# Patient Record
Sex: FEMALE | Race: WHITE | Employment: OTHER | URBAN - METROPOLITAN AREA
[De-identification: names, ages, dates, MRNs, and addresses within clinical notes are randomized per-mention and may not be internally consistent; named-entity substitution may affect disease eponyms.]

---

## 2017-01-19 ENCOUNTER — OP HISTORICAL/CONVERTED ENCOUNTER (OUTPATIENT)
Dept: OTHER | Age: 82
End: 2017-01-19

## 2017-05-17 ENCOUNTER — ED HISTORICAL/CONVERTED ENCOUNTER (OUTPATIENT)
Dept: OTHER | Age: 82
End: 2017-05-17

## 2017-05-23 ENCOUNTER — IP HISTORICAL/CONVERTED ENCOUNTER (OUTPATIENT)
Dept: OTHER | Age: 82
End: 2017-05-23

## 2017-08-14 ENCOUNTER — OP HISTORICAL/CONVERTED ENCOUNTER (OUTPATIENT)
Dept: OTHER | Age: 82
End: 2017-08-14

## 2018-01-22 ENCOUNTER — ED HISTORICAL/CONVERTED ENCOUNTER (OUTPATIENT)
Dept: OTHER | Age: 83
End: 2018-01-22

## 2018-04-06 ENCOUNTER — OP HISTORICAL/CONVERTED ENCOUNTER (OUTPATIENT)
Dept: OTHER | Age: 83
End: 2018-04-06

## 2019-04-13 ENCOUNTER — ED HISTORICAL/CONVERTED ENCOUNTER (OUTPATIENT)
Dept: OTHER | Age: 84
End: 2019-04-13

## 2019-05-06 ENCOUNTER — OP HISTORICAL/CONVERTED ENCOUNTER (OUTPATIENT)
Dept: OTHER | Age: 84
End: 2019-05-06

## 2020-07-27 ENCOUNTER — OP HISTORICAL/CONVERTED ENCOUNTER (OUTPATIENT)
Dept: OTHER | Age: 85
End: 2020-07-27

## 2020-07-29 ENCOUNTER — OP HISTORICAL/CONVERTED ENCOUNTER (OUTPATIENT)
Dept: OTHER | Age: 85
End: 2020-07-29

## 2021-03-29 ENCOUNTER — TELEPHONE (OUTPATIENT)
Dept: ENDOCRINOLOGY | Age: 86
End: 2021-03-29

## 2021-03-29 ENCOUNTER — OFFICE VISIT (OUTPATIENT)
Dept: ENDOCRINOLOGY | Age: 86
End: 2021-03-29
Payer: MEDICARE

## 2021-03-29 VITALS
OXYGEN SATURATION: 94 % | SYSTOLIC BLOOD PRESSURE: 158 MMHG | WEIGHT: 143.6 LBS | HEART RATE: 72 BPM | DIASTOLIC BLOOD PRESSURE: 79 MMHG | BODY MASS INDEX: 23.08 KG/M2 | HEIGHT: 66 IN | TEMPERATURE: 98.2 F

## 2021-03-29 DIAGNOSIS — E05.90 SUBCLINICAL HYPERTHYROIDISM: Primary | ICD-10-CM

## 2021-03-29 PROBLEM — J44.9 COPD (CHRONIC OBSTRUCTIVE PULMONARY DISEASE) (HCC): Status: ACTIVE | Noted: 2021-03-29

## 2021-03-29 PROBLEM — I10 BENIGN ESSENTIAL HTN: Status: ACTIVE | Noted: 2021-03-29

## 2021-03-29 PROBLEM — E04.1 THYROID NODULE: Status: ACTIVE | Noted: 2021-03-29

## 2021-03-29 PROBLEM — M81.0 AGE-RELATED OSTEOPOROSIS WITHOUT CURRENT PATHOLOGICAL FRACTURE: Status: ACTIVE | Noted: 2021-03-29

## 2021-03-29 PROCEDURE — 1090F PRES/ABSN URINE INCON ASSESS: CPT | Performed by: INTERNAL MEDICINE

## 2021-03-29 PROCEDURE — G8420 CALC BMI NORM PARAMETERS: HCPCS | Performed by: INTERNAL MEDICINE

## 2021-03-29 PROCEDURE — 1101F PT FALLS ASSESS-DOCD LE1/YR: CPT | Performed by: INTERNAL MEDICINE

## 2021-03-29 PROCEDURE — 99204 OFFICE O/P NEW MOD 45 MIN: CPT | Performed by: INTERNAL MEDICINE

## 2021-03-29 PROCEDURE — G8536 NO DOC ELDER MAL SCRN: HCPCS | Performed by: INTERNAL MEDICINE

## 2021-03-29 PROCEDURE — G8427 DOCREV CUR MEDS BY ELIG CLIN: HCPCS | Performed by: INTERNAL MEDICINE

## 2021-03-29 PROCEDURE — G8510 SCR DEP NEG, NO PLAN REQD: HCPCS | Performed by: INTERNAL MEDICINE

## 2021-03-29 RX ORDER — ALBUTEROL SULFATE 90 UG/1
AEROSOL, METERED RESPIRATORY (INHALATION)
COMMUNITY
Start: 2021-01-11

## 2021-03-29 RX ORDER — FLUTICASONE PROPIONATE AND SALMETEROL 50; 250 UG/1; UG/1
POWDER RESPIRATORY (INHALATION)
COMMUNITY
Start: 2021-03-12

## 2021-03-29 RX ORDER — ALPRAZOLAM 0.25 MG/1
TABLET ORAL
COMMUNITY
Start: 2021-02-10

## 2021-03-29 RX ORDER — IPRATROPIUM BROMIDE AND ALBUTEROL SULFATE 2.5; .5 MG/3ML; MG/3ML
SOLUTION RESPIRATORY (INHALATION)
COMMUNITY
Start: 2021-02-10

## 2021-03-29 RX ORDER — MONTELUKAST SODIUM 10 MG/1
TABLET ORAL
COMMUNITY
Start: 2021-01-12 | End: 2021-06-29 | Stop reason: ALTCHOICE

## 2021-03-29 RX ORDER — ATENOLOL 50 MG/1
TABLET ORAL
COMMUNITY
Start: 2021-03-12

## 2021-03-29 RX ORDER — ASPIRIN 81 MG/1
1 TABLET ORAL DAILY
COMMUNITY
End: 2021-06-29 | Stop reason: ALTCHOICE

## 2021-03-29 RX ORDER — AZELASTINE 1 MG/ML
SPRAY, METERED NASAL
COMMUNITY
Start: 2021-03-02

## 2021-03-29 RX ORDER — LIDOCAINE 50 MG/G
1 PATCH TOPICAL DAILY
COMMUNITY
Start: 2020-10-09 | End: 2021-06-29 | Stop reason: ALTCHOICE

## 2021-03-29 RX ORDER — AMLODIPINE BESYLATE 5 MG/1
TABLET ORAL
COMMUNITY
Start: 2021-01-12

## 2021-03-29 RX ORDER — IBANDRONATE SODIUM 150 MG/1
TABLET, FILM COATED ORAL
COMMUNITY
Start: 2021-01-12

## 2021-03-29 RX ORDER — ATORVASTATIN CALCIUM 10 MG/1
TABLET, FILM COATED ORAL
COMMUNITY
Start: 2021-01-12

## 2021-03-29 RX ORDER — ROFLUMILAST 500 UG/1
TABLET ORAL
COMMUNITY
Start: 2021-03-03

## 2021-03-29 NOTE — LETTER
3/29/2021 Patient: Ila Hung YOB: 1933 Date of Visit: 3/29/2021 Malick Montaño MD 
[facility] [address] Via Fax: [phone] Tarun Gutiérrez MD 
[facility] [address] Via Fax: [phone] Dear MD Tarun Kim MD, Thank you for referring Ms. Ila Hung to [address] [place] [address] for evaluation. My notes for this consultation are attached. If you have questions, please do not hesitate to call me. I look forward to following your patient along with you. Sincerely, Devaughn Skinner MD

## 2021-03-29 NOTE — TELEPHONE ENCOUNTER
Per PCP note patient had thyroid ultrasound done in 2018, 2019 and 2020. Please call PCP office and ask them to fax all these 3 reports.

## 2021-03-29 NOTE — PROGRESS NOTES
History and Physical    Patient: Jovita King MRN: [id number]  SSN: [id number]    YOB: 1933  Age: 80 y.o. Sex: female      Subjective:      Jovita King is a 80 y.o. female with past medical history of hypertension, hyperlipidemia, COPD, osteoporosis, DVT and PE, pneumothorax is sent to me by primary care physician Dr. Savage Ram for thyroid nodule and abnormal thyroid function tests. Thyroid nodules:  Incidentally found when she had CT chest done for pneumothorax. Since then the pulmonologist who follows her for pneumothorax has been tracking it when she has her annual CT chest.  Also, patient has had ultrasound thyroid done in 2018, 2019 and 2020. It appears that the nodules appeared stable and no further follow-up was required. Denies any difficulty in swallowing, difficulty in breathing or hoarseness of voice. No family history of thyroid cancer. No personal history of radiation exposure to head or neck region. Abnormal thyroid function tests:  No family history of thyroid problems. No prior history of thyroid issues except the above. Patient thinks her weight has been stable, sleep is poor but she thinks she is stressed because of some life events, she has some tremors of her hands, not sure since how long, has 1-3 bowel movements per day, denies any heat or cold intolerance, excessive sweating, hair or skin changes. Does not take biotin or hair skin nail vitamins. Last prednisone treatment was in July 2020. No steroid injections in her joints. She has been on atenolol since 2016 for hypertension. She has osteoporosis for which she takes Boniva, prescribed by PCP. No cardiac issues. Past Medical History:   Diagnosis Date    Hyperlipidemia     Hypertension     Thyroid disease      History reviewed. No pertinent surgical history.    Family History   Problem Relation Age of Onset    Cancer Mother      Social History     Tobacco Use    Smoking status: Former Smoker    Smokeless tobacco: Never Used   Substance Use Topics    Alcohol use: Not Currently      Prior to Admission medications    Medication Sig Start Date End Date Taking? Authorizing Provider   albuterol (PROVENTIL HFA, VENTOLIN HFA, PROAIR HFA) 90 mcg/actuation inhaler inhale 2 puffs by mouth every 4 hours if needed 1/11/21  Yes Provider, Historical   amLODIPine (NORVASC) 5 mg tablet  1/12/21  Yes Provider, Historical   atenoloL (TENORMIN) 50 mg tablet  3/12/21  Yes Provider, Historical   ALPRAZolam Verlene Jackie) 0.25 mg tablet  2/10/21  Yes Provider, Historical   aspirin delayed-release 81 mg tablet Take 1 Tab by mouth daily. Yes Provider, Historical   Advair Diskus 250-50 mcg/dose diskus inhaler  3/12/21  Yes Provider, Historical   azelastine (ASTELIN) 137 mcg (0.1 %) nasal spray instill 2 sprays into each nostril twice a day 3/2/21  Yes Provider, Historical   ibandronate (BONIVA) 150 mg tablet  1/12/21  Yes Provider, Historical   albuterol-ipratropium (DUO-NEB) 2.5 mg-0.5 mg/3 ml nebu  2/10/21  Yes Provider, Historical   atorvastatin (LIPITOR) 10 mg tablet  1/12/21  Yes Provider, Historical   lidocaine (LIDODERM) 5 % 1 Patch daily. 10/9/20  Yes Provider, Historical   montelukast (SINGULAIR) 10 mg tablet  1/12/21  Yes Provider, Historical   Daliresp 500 mcg tab tablet  3/3/21  Yes Provider, Historical   tiotropium (Spiriva with HandiHaler) 18 mcg inhalation capsule Spiriva with HandiHaler 18 mcg and inhalation capsules   Inhale by inhalation route.    Yes Provider, Historical        Allergies   Allergen Reactions    Levofloxacin Unknown (comments)    Tetracycline Unknown (comments)       Review of Systems:  ROS    A comprehensive review of systems was preformed and it is negative except mentioned in HPI    Objective:     Vitals:    03/29/21 1547 03/29/21 1559   BP: (!) 142/65 (!) 158/79   Pulse: 74 72   Temp: 98.2 °F (36.8 °C)    TempSrc: Temporal    SpO2: 94%    Weight: 143 lb 9.6 oz (65.1 kg)    Height: 5' 6\" (1.676 m)         Physical Exam:    Physical Exam  Vitals signs and nursing note reviewed. Constitutional:       Appearance: Normal appearance. HENT:      Head: Normocephalic and atraumatic. Eyes:      Extraocular Movements: Extraocular movements intact. Pupils: Pupils are equal, round, and reactive to light. Neck:      Musculoskeletal: Neck supple. Cardiovascular:      Rate and Rhythm: Normal rate and regular rhythm. Pulmonary:      Effort: Pulmonary effort is normal.      Breath sounds: Normal breath sounds. Abdominal:      General: Bowel sounds are normal.      Palpations: Abdomen is soft. Musculoskeletal: Normal range of motion. General: No swelling. Skin:     General: Skin is warm and dry. Neurological:      General: No focal deficit present. Mental Status: She is alert and oriented to person, place, and time. Comments: Fine tremors of outstretched hands   Psychiatric:         Mood and Affect: Mood normal.         Behavior: Behavior normal.          Labs and Imaging:    Last 3 Recorded Weights in this Encounter    03/29/21 1547   Weight: 143 lb 9.6 oz (65.1 kg)        No results found for: HBA1C, HGBE8, BCR3YZVK, WOA4NNXR, MQR1EIRY     Assessment:     Patient Active Problem List   Diagnosis Code    Subclinical hyperthyroidism E05.90    Benign essential HTN I10    Thyroid nodule E04.1    Age-related osteoporosis without current pathological fracture M81.0    COPD (chronic obstructive pulmonary disease) ([facility]) J44.9           Plan:     Abnormal thyroid function tests:  I reviewed labs and notes from the referring provider's office. 2-4-2021:  TSH suppressed at 0.393 (0.452. 5)  Free T4 normal at 1.46 (0.821.77)    Per PCP note, TSH has varied from 0.53 to 0.435 and now to 0.39    Clinically she has some symptoms of hyperthyroidism including tremors, frequent bowel movements, insomnia.   Plan:  Given her age as well as diagnosis of osteoporosis, treatment will be beneficial in this patient to protect her heart and bones. However, patient is not convinced at this time that she needs treatment. We will repeat complete thyroid labs along with thyroid antibodies in 3 months, prior to next visit. After reviewing these results, I will discuss about treatment again with patient. Left thyroid nodule:  Per PCP note this has been stable from 2018, 2019, 2020. I will obtain ultrasound reports from PCP. Osteoporosis:  On Boniva per primary care physician. Essential hypertension:  Blood pressure slightly elevated today, which may be acceptable given patient's age.     Orders Placed This Encounter    TSH AND FREE T4     Standing Status:   Future     Standing Expiration Date:   9/29/2021    T3 TOTAL     Standing Status:   Future     Standing Expiration Date:   9/29/2021    THYROID STIMULATING IMMUNOGLOBULIN     Standing Status:   Future     Standing Expiration Date:   9/29/2021    THYROID PEROXIDASE (TPO) AB     Standing Status:   Future     Standing Expiration Date:   9/29/2021    TSH RECEPTOR AB     Standing Status:   Future     Standing Expiration Date:   9/29/2021        Signed By: Tesfaye Guevara MD     March 29, 2021      Return to clinic 3 months

## 2021-04-12 NOTE — TELEPHONE ENCOUNTER
Did I ever give you labs for this patient? Trying to figure out if I need to call the office back for results.

## 2021-06-22 LAB
T3 SERPL-MCNC: 134 NG/DL (ref 71–180)
T4 FREE SERPL-MCNC: 1.5 NG/DL (ref 0.82–1.77)
THYROPEROXIDASE AB SERPL-ACNC: 11 IU/ML (ref 0–34)
TSH RECEP AB SER-ACNC: <1.1 IU/L (ref 0–1.75)
TSH SERPL DL<=0.005 MIU/L-ACNC: 0.53 UIU/ML (ref 0.45–4.5)
TSI SER-ACNC: <0.1 IU/L (ref 0–0.55)

## 2021-06-29 ENCOUNTER — OFFICE VISIT (OUTPATIENT)
Dept: ENDOCRINOLOGY | Age: 86
End: 2021-06-29
Payer: MEDICARE

## 2021-06-29 VITALS
SYSTOLIC BLOOD PRESSURE: 132 MMHG | TEMPERATURE: 97.7 F | BODY MASS INDEX: 23.14 KG/M2 | HEART RATE: 70 BPM | OXYGEN SATURATION: 95 % | WEIGHT: 144 LBS | DIASTOLIC BLOOD PRESSURE: 76 MMHG | HEIGHT: 66 IN

## 2021-06-29 DIAGNOSIS — E05.90 SUBCLINICAL HYPERTHYROIDISM: Primary | ICD-10-CM

## 2021-06-29 DIAGNOSIS — E05.90 SUBCLINICAL HYPERTHYROIDISM: ICD-10-CM

## 2021-06-29 PROCEDURE — 1101F PT FALLS ASSESS-DOCD LE1/YR: CPT | Performed by: INTERNAL MEDICINE

## 2021-06-29 PROCEDURE — 1090F PRES/ABSN URINE INCON ASSESS: CPT | Performed by: INTERNAL MEDICINE

## 2021-06-29 PROCEDURE — 99214 OFFICE O/P EST MOD 30 MIN: CPT | Performed by: INTERNAL MEDICINE

## 2021-06-29 PROCEDURE — G8420 CALC BMI NORM PARAMETERS: HCPCS | Performed by: INTERNAL MEDICINE

## 2021-06-29 PROCEDURE — G8510 SCR DEP NEG, NO PLAN REQD: HCPCS | Performed by: INTERNAL MEDICINE

## 2021-06-29 PROCEDURE — G8427 DOCREV CUR MEDS BY ELIG CLIN: HCPCS | Performed by: INTERNAL MEDICINE

## 2021-06-29 PROCEDURE — G8536 NO DOC ELDER MAL SCRN: HCPCS | Performed by: INTERNAL MEDICINE

## 2021-06-29 RX ORDER — MINERAL OIL
ENEMA (ML) RECTAL
COMMUNITY

## 2021-06-29 NOTE — PROGRESS NOTES
History and Physical    Patient: Rupinder Jacobo MRN: [id number]  SSN: [id number]    YOB: 1933  Age: 80 y.o. Sex: female      Subjective:      Rupinder Jacobo is a 80 y.o. female with past medical history of hypertension, hyperlipidemia, COPD, osteoporosis, DVT and PE, pneumothorax is for follow-up of abnormal thyroid function tests. She was sent to me by primary care physician Dr. Hillary France. Thyroid nodules:  After the last visit I obtained and reviewed ultrasound reports from 2018 in 2020. Patient denies any change in her neck symptoms since the last visit. Initial history:  Incidentally found when she had CT chest done for pneumothorax. Since then the pulmonologist who follows her for pneumothorax has been tracking it when she has her annual CT chest.  Also, patient has had ultrasound thyroid done in 2018, 2019 and 2020. It appears that the nodules appeared stable and no further follow-up was required. Denies any difficulty in swallowing, difficulty in breathing or hoarseness of voice. No family history of thyroid cancer. No personal history of radiation exposure to head or neck region. Abnormal thyroid function tests: At the last visit I discussed with patient that her TSH is fluctuating. Some of her symptoms could be related to thyroid, especially anxiety, palpitations. We discussed observation versus medical management. Patient decided observation because she was not convinced she needs treatment. She had labs repeated prior to this visit. She tells me that since the last visit she has not had any more palpitations or anxiety. Initial history:  No family history of thyroid problems. No prior history of thyroid issues except the above.   Patient thinks her weight has been stable, sleep is poor but she thinks she is stressed because of some life events, she has some tremors of her hands, not sure since how long, has 1-3 bowel movements per day, denies any heat or cold intolerance, excessive sweating, hair or skin changes. Does not take biotin or hair skin nail vitamins. Last prednisone treatment was in July 2020. No steroid injections in her joints. She has been on atenolol since 2016 for hypertension. She has osteoporosis for which she takes Boniva, prescribed by PCP. No cardiac issues. Past Medical History:   Diagnosis Date    Hyperlipidemia     Hypertension     Thyroid disease      History reviewed. No pertinent surgical history. Family History   Problem Relation Age of Onset    Cancer Mother      Social History     Tobacco Use    Smoking status: Former Smoker    Smokeless tobacco: Never Used   Substance Use Topics    Alcohol use: Not Currently      Prior to Admission medications    Medication Sig Start Date End Date Taking? Authorizing Provider   fexofenadine (ALLEGRA) 180 mg tablet Take  by mouth. Yes Provider, Historical   albuterol (PROVENTIL HFA, VENTOLIN HFA, PROAIR HFA) 90 mcg/actuation inhaler inhale 2 puffs by mouth every 4 hours if needed 1/11/21  Yes Provider, Historical   amLODIPine (NORVASC) 5 mg tablet  1/12/21  Yes Provider, Historical   atenoloL (TENORMIN) 50 mg tablet  3/12/21  Yes Provider, Historical   ALPRAZolam (XANAX) 0.25 mg tablet  2/10/21  Yes Provider, Historical   Advair Diskus 250-50 mcg/dose diskus inhaler  3/12/21  Yes Provider, Historical   azelastine (ASTELIN) 137 mcg (0.1 %) nasal spray instill 2 sprays into each nostril twice a day 3/2/21  Yes Provider, Historical   ibandronate (BONIVA) 150 mg tablet  1/12/21  Yes Provider, Historical   albuterol-ipratropium (DUO-NEB) 2.5 mg-0.5 mg/3 ml nebu  2/10/21  Yes Provider, Historical   atorvastatin (LIPITOR) 10 mg tablet  1/12/21  Yes Provider, Historical   Daliresp 500 mcg tab tablet  3/3/21  Yes Provider, Historical   tiotropium (Spiriva with HandiHaler) 18 mcg inhalation capsule Spiriva with HandiHaler 18 mcg and inhalation capsules   Inhale by inhalation route.    Yes Provider, Historical        Allergies   Allergen Reactions    Levofloxacin Unknown (comments)    Tetracycline Unknown (comments)       Review of Systems:  ROS    A comprehensive review of systems was preformed and it is negative except mentioned in HPI    Objective:     Vitals:    06/29/21 1416   BP: 132/76   Pulse: 70   Temp: 97.7 °F (36.5 °C)   TempSrc: Temporal   SpO2: 95%   Weight: 144 lb (65.3 kg)   Height: 5' 6\" (1.676 m)        Physical Exam:    Physical Exam  Vitals and nursing note reviewed. Constitutional:       Appearance: Normal appearance. HENT:      Head: Normocephalic and atraumatic. Neurological:      Mental Status: She is alert. Labs and Imaging:    Last 3 Recorded Weights in this Encounter    06/29/21 1416   Weight: 144 lb (65.3 kg)        No results found for: HBA1C, ZTO3HIWW, TYB1MRAR, WOW6TLTR     Assessment:     Patient Active Problem List   Diagnosis Code    Subclinical hyperthyroidism E05.90    Benign essential HTN I10    Thyroid nodule E04.1    Age-related osteoporosis without current pathological fracture M81.0    COPD (chronic obstructive pulmonary disease) ([address] J44.9           Plan:     Abnormal thyroid function tests:  2-4-2021:  TSH suppressed at 0.393 (0.452. 5)  Free T4 normal at 1.46 (0.821.77)    Per PCP note, TSH has varied from 0.53 to 0.435 and now to 0.39    Clinically she has some symptoms of hyperthyroidism including tremors, frequent bowel movements, insomnia. Given her age as well as diagnosis of osteoporosis, treatment will be beneficial in this patient to protect her heart and bones. However, patient is not convinced at this time that she needs treatment. 1958629:  TSH normal at 0.529  Free T4 normal at 1.5  Total T3 normal at 134  TPO normal at 11 (034)  TSI and TSH receptor antibody undetectable  Plan:  Not sure why TSH has been suppressed intermittently, ?   Subacute thyroiditis which appears to have resolved or intermittently functioning thyroid nodule  No indication for treatment at this time. Follow-up with TSH every 6 months with PCP. I will see her back as needed. Left thyroid nodule: Thyroid ultrasound report from 4-6-2018:  Left lobe spongiform nodule 0.8 x 0.9 x 1 cm    7-:  Left lobe spongiform nodule 9.9 x 8.2 x 10.3 mm     Given stability in the size of this nodule as well as no concerning features, no further follow-up required. Osteoporosis:  On Boniva per primary care physician. Essential hypertension:  Blood pressure well controlled on current medications    No orders of the defined types were placed in this encounter.        Signed By: Opal Phillips MD     June 29, 2021      Return to clinic as needed

## 2021-06-29 NOTE — LETTER
6/29/2021    Patient: Piotr Thorpe   YOB: 1933   Date of Visit: 6/29/2021     Faisal Downing MD  [address] [address]  Via Fax: [phone]    Dear Faisal Downing MD,      Thank you for referring Ms. Piotr Thorpe to [address] [place] [address] for evaluation. My notes for this consultation are attached. If you have questions, please do not hesitate to call me. I look forward to following your patient along with you.       Sincerely,    Aldair Velázquez MD

## 2021-07-12 ENCOUNTER — TRANSCRIBE ORDER (OUTPATIENT)
Dept: SCHEDULING | Age: 86
End: 2021-07-12

## 2021-07-12 DIAGNOSIS — Z12.31 SCREENING MAMMOGRAM FOR HIGH-RISK PATIENT: Primary | ICD-10-CM

## 2021-07-14 ENCOUNTER — HOSPITAL ENCOUNTER (OUTPATIENT)
Dept: MAMMOGRAPHY | Age: 86
Discharge: HOME | End: 2021-07-14
Attending: FAMILY MEDICINE
Payer: MEDICARE

## 2021-07-14 DIAGNOSIS — Z12.31 SCREENING MAMMOGRAM FOR HIGH-RISK PATIENT: ICD-10-CM

## 2021-07-14 PROCEDURE — 77063 BREAST TOMOSYNTHESIS BI: CPT

## 2022-03-19 PROBLEM — E05.90 SUBCLINICAL HYPERTHYROIDISM: Status: ACTIVE | Noted: 2021-03-29

## 2022-03-19 PROBLEM — I10 BENIGN ESSENTIAL HTN: Status: ACTIVE | Noted: 2021-03-29

## 2022-03-19 PROBLEM — M81.0 AGE-RELATED OSTEOPOROSIS WITHOUT CURRENT PATHOLOGICAL FRACTURE: Status: ACTIVE | Noted: 2021-03-29

## 2022-03-20 PROBLEM — E04.1 THYROID NODULE: Status: ACTIVE | Noted: 2021-03-29

## 2022-03-20 PROBLEM — J44.9 COPD (CHRONIC OBSTRUCTIVE PULMONARY DISEASE) (HCC): Status: ACTIVE | Noted: 2021-03-29

## 2022-09-06 ENCOUNTER — TRANSCRIBE ORDER (OUTPATIENT)
Dept: SCHEDULING | Age: 87
End: 2022-09-06

## 2022-09-06 DIAGNOSIS — Z12.31 VISIT FOR SCREENING MAMMOGRAM: Primary | ICD-10-CM

## 2022-11-15 ENCOUNTER — HOSPITAL ENCOUNTER (OUTPATIENT)
Dept: MAMMOGRAPHY | Age: 87
Discharge: HOME | End: 2022-11-15
Attending: FAMILY MEDICINE
Payer: MEDICARE

## 2022-11-15 DIAGNOSIS — Z12.31 VISIT FOR SCREENING MAMMOGRAM: ICD-10-CM

## 2022-11-15 PROCEDURE — 77063 BREAST TOMOSYNTHESIS BI: CPT

## 2023-05-08 ENCOUNTER — HOSPITAL ENCOUNTER (OUTPATIENT)
Facility: HOSPITAL | Age: 88
Discharge: HOME | End: 2023-05-11
Payer: MEDICARE

## 2023-05-08 ENCOUNTER — TRANSCRIBE ORDERS (OUTPATIENT)
Facility: HOSPITAL | Age: 88
End: 2023-05-08

## 2023-05-08 DIAGNOSIS — R93.5 ABNORMAL CT OF THE ABDOMEN: Primary | ICD-10-CM

## 2023-05-08 DIAGNOSIS — R93.5 ABNORMAL CT OF THE ABDOMEN: ICD-10-CM

## 2023-05-08 PROCEDURE — 76856 US EXAM PELVIC COMPLETE: CPT

## 2023-05-24 ENCOUNTER — ANESTHESIA (OUTPATIENT)
Facility: HOSPITAL | Age: 88
End: 2023-05-24
Payer: MEDICARE

## 2023-05-24 ENCOUNTER — HOSPITAL ENCOUNTER (OUTPATIENT)
Facility: HOSPITAL | Age: 88
Setting detail: OUTPATIENT SURGERY
Discharge: HOME | End: 2023-05-24
Attending: INTERNAL MEDICINE | Admitting: INTERNAL MEDICINE
Payer: MEDICARE

## 2023-05-24 ENCOUNTER — ANESTHESIA EVENT (OUTPATIENT)
Facility: HOSPITAL | Age: 88
End: 2023-05-24
Payer: MEDICARE

## 2023-05-24 VITALS
BODY MASS INDEX: 19.78 KG/M2 | RESPIRATION RATE: 14 BRPM | DIASTOLIC BLOOD PRESSURE: 74 MMHG | HEIGHT: 67 IN | OXYGEN SATURATION: 94 % | TEMPERATURE: 98.4 F | HEART RATE: 90 BPM | WEIGHT: 126 LBS | SYSTOLIC BLOOD PRESSURE: 125 MMHG

## 2023-05-24 PROCEDURE — 3600007512: Performed by: INTERNAL MEDICINE

## 2023-05-24 PROCEDURE — 2580000003 HC RX 258: Performed by: INTERNAL MEDICINE

## 2023-05-24 PROCEDURE — 3600007502: Performed by: INTERNAL MEDICINE

## 2023-05-24 PROCEDURE — 88305 TISSUE EXAM BY PATHOLOGIST: CPT

## 2023-05-24 PROCEDURE — 6360000002 HC RX W HCPCS: Performed by: NURSE ANESTHETIST, CERTIFIED REGISTERED

## 2023-05-24 PROCEDURE — 3700000000 HC ANESTHESIA ATTENDED CARE: Performed by: INTERNAL MEDICINE

## 2023-05-24 PROCEDURE — 2709999900 HC NON-CHARGEABLE SUPPLY: Performed by: INTERNAL MEDICINE

## 2023-05-24 PROCEDURE — 2580000003 HC RX 258: Performed by: NURSE ANESTHETIST, CERTIFIED REGISTERED

## 2023-05-24 PROCEDURE — 7100000011 HC PHASE II RECOVERY - ADDTL 15 MIN: Performed by: INTERNAL MEDICINE

## 2023-05-24 PROCEDURE — 3700000001 HC ADD 15 MINUTES (ANESTHESIA): Performed by: INTERNAL MEDICINE

## 2023-05-24 PROCEDURE — 2500000003 HC RX 250 WO HCPCS: Performed by: NURSE ANESTHETIST, CERTIFIED REGISTERED

## 2023-05-24 PROCEDURE — 7100000010 HC PHASE II RECOVERY - FIRST 15 MIN: Performed by: INTERNAL MEDICINE

## 2023-05-24 RX ORDER — SODIUM CHLORIDE 9 MG/ML
INJECTION, SOLUTION INTRAVENOUS CONTINUOUS PRN
Status: COMPLETED | OUTPATIENT
Start: 2023-05-24 | End: 2023-05-24

## 2023-05-24 RX ORDER — ALPRAZOLAM 0.25 MG/1
0.25 TABLET ORAL NIGHTLY PRN
COMMUNITY

## 2023-05-24 RX ORDER — SODIUM CHLORIDE 9 MG/ML
25 INJECTION, SOLUTION INTRAVENOUS PRN
Status: DISCONTINUED | OUTPATIENT
Start: 2023-05-24 | End: 2023-05-24 | Stop reason: HOSPADM

## 2023-05-24 RX ORDER — SODIUM CHLORIDE 9 MG/ML
INJECTION, SOLUTION INTRAVENOUS CONTINUOUS
Status: DISCONTINUED | OUTPATIENT
Start: 2023-05-24 | End: 2023-05-24 | Stop reason: HOSPADM

## 2023-05-24 RX ORDER — METHIMAZOLE 5 MG/1
5 TABLET ORAL 3 TIMES DAILY
COMMUNITY

## 2023-05-24 RX ORDER — ATENOLOL 100 MG/1
100 TABLET ORAL DAILY
COMMUNITY

## 2023-05-24 RX ORDER — FLUTICASONE PROPIONATE AND SALMETEROL 100; 50 UG/1; UG/1
1 POWDER RESPIRATORY (INHALATION) EVERY 12 HOURS
COMMUNITY

## 2023-05-24 RX ORDER — FOLIC ACID 1 MG/1
1 TABLET ORAL DAILY
COMMUNITY

## 2023-05-24 RX ORDER — 0.9 % SODIUM CHLORIDE 0.9 %
INTRAVENOUS SOLUTION INTRAVENOUS PRN
Status: DISCONTINUED | OUTPATIENT
Start: 2023-05-24 | End: 2023-05-24 | Stop reason: SDUPTHER

## 2023-05-24 RX ORDER — ROFLUMILAST 500 UG/1
500 TABLET ORAL DAILY
COMMUNITY

## 2023-05-24 RX ORDER — IBANDRONATE SODIUM 150 MG/1
150 TABLET, FILM COATED ORAL
COMMUNITY

## 2023-05-24 RX ORDER — CHOLECALCIFEROL (VITAMIN D3) 1250 MCG
CAPSULE ORAL
COMMUNITY

## 2023-05-24 RX ORDER — SODIUM CHLORIDE 0.9 % (FLUSH) 0.9 %
5-40 SYRINGE (ML) INJECTION EVERY 12 HOURS SCHEDULED
Status: DISCONTINUED | OUTPATIENT
Start: 2023-05-24 | End: 2023-05-24 | Stop reason: HOSPADM

## 2023-05-24 RX ORDER — AMLODIPINE BESYLATE 5 MG/1
5 TABLET ORAL DAILY
COMMUNITY

## 2023-05-24 RX ORDER — SODIUM CHLORIDE 0.9 % (FLUSH) 0.9 %
5-40 SYRINGE (ML) INJECTION PRN
Status: DISCONTINUED | OUTPATIENT
Start: 2023-05-24 | End: 2023-05-24 | Stop reason: HOSPADM

## 2023-05-24 RX ORDER — LIDOCAINE HYDROCHLORIDE 20 MG/ML
INJECTION, SOLUTION EPIDURAL; INFILTRATION; INTRACAUDAL; PERINEURAL PRN
Status: DISCONTINUED | OUTPATIENT
Start: 2023-05-24 | End: 2023-05-24 | Stop reason: SDUPTHER

## 2023-05-24 RX ORDER — ASPIRIN 81 MG/1
81 TABLET ORAL DAILY
COMMUNITY

## 2023-05-24 RX ORDER — BACILLUS COAGULANS/INULIN 1B-250 MG
CAPSULE ORAL
COMMUNITY

## 2023-05-24 RX ORDER — PHENYLEPHRINE HCL IN 0.9% NACL 0.4MG/10ML
SYRINGE (ML) INTRAVENOUS PRN
Status: DISCONTINUED | OUTPATIENT
Start: 2023-05-24 | End: 2023-05-24 | Stop reason: SDUPTHER

## 2023-05-24 RX ORDER — ATORVASTATIN CALCIUM 10 MG/1
10 TABLET, FILM COATED ORAL DAILY
COMMUNITY

## 2023-05-24 RX ADMIN — Medication 80 MCG: at 13:16

## 2023-05-24 RX ADMIN — SODIUM CHLORIDE 500 ML: 9 INJECTION, SOLUTION INTRAVENOUS at 13:00

## 2023-05-24 RX ADMIN — PROPOFOL 90 MG: 10 INJECTION, EMULSION INTRAVENOUS at 13:13

## 2023-05-24 RX ADMIN — LIDOCAINE HYDROCHLORIDE 50 MG: 20 INJECTION, SOLUTION EPIDURAL; INFILTRATION; INTRACAUDAL; PERINEURAL at 13:13

## 2023-05-24 RX ADMIN — PROPOFOL 30 MG: 10 INJECTION, EMULSION INTRAVENOUS at 13:26

## 2023-05-24 RX ADMIN — PROPOFOL 30 MG: 10 INJECTION, EMULSION INTRAVENOUS at 13:22

## 2023-05-24 ASSESSMENT — COPD QUESTIONNAIRES: CAT_SEVERITY: MODERATE

## 2023-05-24 NOTE — H&P
[address]  [facility] [address]  [place], [address]  [phone]                                History and Physical     NAME: Raina Michelle   :  1933   MRN:  [id number]     HPI:  The patient was seen and examined. History reviewed. No pertinent surgical history. Past Medical History:   Diagnosis Date    COPD (chronic obstructive pulmonary disease) ([address]     on o2--3l nc    Hyperlipidemia     Hypertension     Thyroid disease      Social History     Tobacco Use    Smoking status: Former    Smokeless tobacco: Never   Substance Use Topics    Alcohol use: Not Currently    Drug use: Never     Allergies   Allergen Reactions    Other Swelling     cilantro    Tetracyclines & Related Rash     Family History   Problem Relation Age of Onset    Cancer Mother      Current Facility-Administered Medications   Medication Dose Route Frequency    0.9 % sodium chloride infusion   IntraVENous Continuous    sodium chloride flush 0.9 % injection 5-40 mL  5-40 mL IntraVENous 2 times per day    sodium chloride flush 0.9 % injection 5-40 mL  5-40 mL IntraVENous PRN    0.9 % sodium chloride infusion  25 mL IntraVENous PRN    0.9 % sodium chloride infusion    Continuous PRN         PHYSICAL EXAM:  General: WD, WN. Alert, cooperative, no acute distress    HEENT: NC, Atraumatic. PERRLA, EOMI. Anicteric sclerae. Lungs:  CTA Bilaterally. No Wheezing/Rhonchi/Rales. Heart:  Regular  rhythm,  No murmur, No Rubs, No Gallops  Abdomen: Soft, Non distended, Non tender. +Bowel sounds, no HSM  Extremities: No c/c/e  Neurologic:  CN 2-12 gi, Alert and oriented X 3. No acute neurological distress   Psych:   Good insight. Not anxious nor agitated. The heart, lungs and mental status were satisfactory for the administration of MAC sedation and for the procedure.       Mallampati score: 2     The patient was counseled at length about the risks of arvin Covid-19 in the tracey-operative and post-operative states

## 2023-05-24 NOTE — PROGRESS NOTES
Verified patient name and date of birth, scheduled procedure, and informed consent. Reviewed general discharge instructions and  information. Assessed patient. Awake, alert, and oriented per baseline. Vital signs stable (see vital sign flowsheet). Respiratory status within defined limits, abdomen soft and non tender. Skin with in defined limits.

## 2023-05-24 NOTE — ANESTHESIA POSTPROCEDURE EVALUATION
Department of Anesthesiology  Postprocedure Note    Patient: Nehemiah Rushing  MRN: [id number]  YOB: 1933  Date of evaluation: 5/24/2023      Procedure Summary     Date: 05/24/23 Room / Location: [facility] ENDO  / [facility] ENDOSCOPY    Anesthesia Start: 1300 Anesthesia Stop:     Procedures:        FLEXIBLE SIGMOIDOSCOPY      COLONOSCOPY POLYPECTOMY SNARE/COLD BIOPSY Diagnosis:       Abnormal PET scan of colon      Lung nodule      (Abnormal PET scan of colon [R94.8])      (Lung nodule [R91.1])    Surgeons: Zaynab Cameron MD Responsible Provider: Cosmo Rocha MD    Anesthesia Type: MAC ASA Status: 3          Anesthesia Type: MAC    Yaniv Phase I: Yaniv Score: 10    Yaniv Phase II: Yaniv Score: 9      Anesthesia Post Evaluation    Patient location during evaluation: PACU  Level of consciousness: awake  Airway patency: patent  Nausea & Vomiting: no nausea  Complications: no  Cardiovascular status: blood pressure returned to baseline  Respiratory status: acceptable  Hydration status: euvolemic  Comments: ---------------------------               05/24/23                      1343         ---------------------------   BP:                         Pulse:                      Resp:                       Temp:   98.4 °F (36.9 °C)   SpO2:                      ---------------------------

## 2023-05-24 NOTE — OP NOTE
polyp Tissue Colon-Ascending SURGICAL PATHOLOGY Honey Contreras MD 5/24/2023 1319    B : proximal sigmoid colon Tissue Sigmoid Colon SURGICAL PATHOLOGY Honey Contreras MD 5/24/2023 1323    C : distal sigmoid colon Tissue Sigmoid Colon SURGICAL PATHOLOGY Honey Contreras MD 5/24/2023 1328    D : recto-sigmoid colon Tissue Recto sigmoid SURGICAL PATHOLOGY Honey Contreras MD 5/24/2023 1329    E :  Tissue Rectum SURGICAL PATHOLOGY Honey Contreras MD 5/24/2023 9304        Complications: None. EBL:  None. Recommendations:   -Await pathology.  -High fiber diet.     -Resume normal medication(s). -NO aspirin for 7 days     Discharge Disposition:  Home in the company of a  when able to ambulate.     Honey Contreras MD  5/24/2023  1:38 PM

## 2023-05-24 NOTE — DISCHARGE INSTRUCTIONS
professional. If you have questions about a medical condition or this instruction, always ask your healthcare professional. [place] [address] any warranty or liability for your use of this information.

## 2023-05-24 NOTE — ANESTHESIA PRE PROCEDURE
Department of Anesthesiology  Preprocedure Note       Name:  Nehemiah Rushing   Age:  80 y.o.  :  1933                                          MRN:  [id number]         Date:  2023      Surgeon: Chel Dennis):  Zaynab Cameron MD    Procedure: Procedure(s): FLEXIBLE SIGMOIDOSCOPY    Medications prior to admission:   Prior to Admission medications    Not on File       Current medications:    Current Facility-Administered Medications   Medication Dose Route Frequency Provider Last Rate Last Admin    0.9 % sodium chloride infusion   IntraVENous Continuous Soco Suarez MD        sodium chloride flush 0.9 % injection 5-40 mL  5-40 mL IntraVENous 2 times per day Zaynab Cameron MD        sodium chloride flush 0.9 % injection 5-40 mL  5-40 mL IntraVENous PRN Soco Suarez MD        0.9 % sodium chloride infusion  25 mL IntraVENous PRN Zaynab Cameron MD           Allergies: Allergies   Allergen Reactions    Other Swelling     cilantro    Tetracyclines & Related Rash       Problem List:    Patient Active Problem List   Diagnosis Code    Age-related osteoporosis without current pathological fracture M81.0    Subclinical hyperthyroidism E05.90    Benign essential HTN I10    COPD (chronic obstructive pulmonary disease) ([facility]) J44.9    Thyroid nodule E04.1       Past Medical History:        Diagnosis Date    Hyperlipidemia     Hypertension     Thyroid disease        Past Surgical History:  No past surgical history on file. Social History:    Social History     Tobacco Use    Smoking status: Former    Smokeless tobacco: Never   Substance Use Topics    Alcohol use: Not Currently                                Counseling given: Not Answered      Vital Signs (Current): There were no vitals filed for this visit.                                            BP Readings from Last 3 Encounters:   21 132/76   21 (!) 158/79       NPO Status: Patient confused today. Only oriented to self. VSS. Pur wic in place and draining appropriate amount of clear, yellow, urine. Patient being turned every few hours, zinc paste applied to rom area and buttocks several times throughout the night. Patient skin very irritated and red in rom area and buttocks. Pt refused speciality bed. Patient has had lots of pain and nausea overnight. Call light in reach. Will continue to monitor.

## 2023-05-26 RX ORDER — ALBUTEROL SULFATE 90 UG/1
AEROSOL, METERED RESPIRATORY (INHALATION)
COMMUNITY
Start: 2021-01-11

## 2023-05-26 RX ORDER — FLUTICASONE PROPIONATE AND SALMETEROL 250; 50 UG/1; UG/1
POWDER RESPIRATORY (INHALATION)
COMMUNITY
Start: 2021-03-12

## 2023-05-26 RX ORDER — ATORVASTATIN CALCIUM 10 MG/1
TABLET, FILM COATED ORAL
COMMUNITY
Start: 2021-01-12

## 2023-05-26 RX ORDER — FEXOFENADINE HCL 180 MG/1
TABLET ORAL
COMMUNITY

## 2023-05-26 RX ORDER — ATENOLOL 50 MG/1
TABLET ORAL
COMMUNITY
Start: 2021-03-12

## 2023-05-26 RX ORDER — AZELASTINE 1 MG/ML
SPRAY, METERED NASAL
COMMUNITY
Start: 2021-03-02

## 2023-05-26 RX ORDER — IBANDRONATE SODIUM 150 MG/1
TABLET, FILM COATED ORAL
COMMUNITY
Start: 2021-01-12

## 2023-05-26 RX ORDER — IPRATROPIUM BROMIDE AND ALBUTEROL SULFATE 2.5; .5 MG/3ML; MG/3ML
SOLUTION RESPIRATORY (INHALATION)
COMMUNITY
Start: 2021-02-10

## 2023-05-26 RX ORDER — ALPRAZOLAM 0.25 MG/1
TABLET ORAL
COMMUNITY
Start: 2021-02-10

## 2023-05-26 RX ORDER — AMLODIPINE BESYLATE 5 MG/1
TABLET ORAL
COMMUNITY
Start: 2021-01-12

## (undated) DEVICE — ELECTRODE PT RET AD L9FT HI MOIST COND ADH HYDRGEL CORDED

## (undated) DEVICE — SNARE ENDOSCP POLYP MED STD AD 2.4X27X240 CM 2.8 MM OVL SENS